# Patient Record
Sex: MALE | URBAN - METROPOLITAN AREA
[De-identification: names, ages, dates, MRNs, and addresses within clinical notes are randomized per-mention and may not be internally consistent; named-entity substitution may affect disease eponyms.]

---

## 2018-06-21 ENCOUNTER — IMPORTED ENCOUNTER (OUTPATIENT)
Dept: URBAN - METROPOLITAN AREA CLINIC 1 | Facility: CLINIC | Age: 73
End: 2018-06-21

## 2018-06-21 PROBLEM — E11.9: Noted: 2018-06-21

## 2018-06-21 PROBLEM — H10.45: Noted: 2018-06-21

## 2018-06-21 PROBLEM — Z79.84: Noted: 2018-06-21

## 2018-06-21 PROBLEM — H25.813: Noted: 2018-06-21

## 2018-06-21 PROCEDURE — 92015 DETERMINE REFRACTIVE STATE: CPT

## 2018-06-21 PROCEDURE — 92004 COMPRE OPH EXAM NEW PT 1/>: CPT

## 2018-06-21 NOTE — PATIENT DISCUSSION
1.  DM Type II (Oral Meds) -- Without sign of diabetic retinopathy and no blot heme on dilated retinal examination today OU No Macular Edema. Discussed the pathophysiology of diabetes and its effect on the eye and risk of blindness. Stressed the importance of strong glucose control. Advised of importance of at least yearly dilated examinations but to contact us immediately for any problems or concerns. 2. Cataracts OU --  Visually Significant secondary to glare discussed the risks benefits alternatives and limitations of cataract surgery. The patient stated a full understanding and a desire to proceed with the procedure. The patient will need to return for preop appointment with cataract measurements and to have any additional questions answered and start pre-operative eye drops as directed. Not examined by PMG today Phaco PCL OS first then ODOtherwise follow-up 6 MO 10 / [facility] / Juan Naun / [place] 3. Allergic Conjunctivitis OU -- The condition was  discussed with the patient. Avoidance of allergens and cool compresses were recommended. Has been being treated with Patanol that was previously being prescribed by Dr. Raj James. Continue Patanol Qday OU (written rx given) Letter to Dr. Sarah Burgos. Advised that patient is not legal to drive without glasses. Strongly advised patient to wear glasses while driving at night. Return for an appointment for a 10 / [facility] / [place] with Dr. eFmi Araujo.

## 2018-10-01 ENCOUNTER — IMPORTED ENCOUNTER (OUTPATIENT)
Dept: URBAN - METROPOLITAN AREA CLINIC 1 | Facility: CLINIC | Age: 73
End: 2018-10-01

## 2018-10-01 PROBLEM — H25.813: Noted: 2018-10-01

## 2018-10-01 PROCEDURE — 92136 OPHTHALMIC BIOMETRY: CPT

## 2018-10-01 PROCEDURE — 92012 INTRM OPH EXAM EST PATIENT: CPT

## 2018-10-01 NOTE — PATIENT DISCUSSION
Cataract OU:  Visually Significant secondary to glare discussed the risks benefits alternatives and limitations of cataract surgery. The patient stated a full understanding and a desire to proceed with the procedure. The patient will need to start pre-operative eye drops as directed. Proceed w/ phaco PCL OS then ODPt understands they will need glasses post-op to achieve their best corrected vision. Return for an appointment for sx OS with Dr. Raul Flowers.

## 2018-10-10 ENCOUNTER — IMPORTED ENCOUNTER (OUTPATIENT)
Dept: URBAN - METROPOLITAN AREA CLINIC 1 | Facility: CLINIC | Age: 73
End: 2018-10-10

## 2018-10-11 ENCOUNTER — IMPORTED ENCOUNTER (OUTPATIENT)
Dept: URBAN - METROPOLITAN AREA CLINIC 1 | Facility: CLINIC | Age: 73
End: 2018-10-11

## 2018-10-11 PROBLEM — Z96.1: Noted: 2018-10-11

## 2018-10-11 PROCEDURE — 99024 POSTOP FOLLOW-UP VISIT: CPT

## 2018-10-11 NOTE — PATIENT DISCUSSION
POD#1 CE/IOL OS doing well. Continue all 3 gtts as prescribed and until gone.  Prednisolone BID OS Acular Qdaily OS Vigamox TID OS Post op Warnings Reiterated RTC as scheduled

## 2018-10-16 ENCOUNTER — IMPORTED ENCOUNTER (OUTPATIENT)
Dept: URBAN - METROPOLITAN AREA CLINIC 1 | Facility: CLINIC | Age: 73
End: 2018-10-16

## 2018-10-16 PROBLEM — H25.811: Noted: 2018-10-16

## 2018-10-16 PROCEDURE — 92136 OPHTHALMIC BIOMETRY: CPT

## 2018-10-16 NOTE — PATIENT DISCUSSION
1.  Cataract OD: Visually Significant secondary to glare discussed the risks benefits alternatives and limitations of cataract surgery. The patient stated a full understanding and a desire to proceed with the procedure. Pt understands they will need glasses post-op to achieve their best corrected vision. Phaco PCL OD 2. POW#2  CE/IOL OS (Standard) doing well.    Use Prednisolone BID OS till out Use Acular Qdaily OS till out F/u as scheduled 2nd eye

## 2018-10-24 ENCOUNTER — IMPORTED ENCOUNTER (OUTPATIENT)
Dept: URBAN - METROPOLITAN AREA CLINIC 1 | Facility: CLINIC | Age: 73
End: 2018-10-24

## 2018-10-25 ENCOUNTER — IMPORTED ENCOUNTER (OUTPATIENT)
Dept: URBAN - METROPOLITAN AREA CLINIC 1 | Facility: CLINIC | Age: 73
End: 2018-10-25

## 2018-10-25 PROBLEM — Z96.1: Noted: 2018-10-25

## 2018-10-25 PROCEDURE — 99024 POSTOP FOLLOW-UP VISIT: CPT

## 2018-10-25 NOTE — PATIENT DISCUSSION
1. POD#1 Phaco/ PCL Standard OD- doing well. Continue all 3 gtts as prescribed and until gone. Prednisolone BID OD Acular Qdaily OD Vigamox TID OD  Post op Warnings Reiterated 2.  POW#2 Phaco/ PCL Standard OS- doing well Use Prednisolone BID OS till out Use Acular Qdaily OS till out Post op Warnings Reiterated RTC as scheduled

## 2018-11-19 ENCOUNTER — IMPORTED ENCOUNTER (OUTPATIENT)
Dept: URBAN - METROPOLITAN AREA CLINIC 1 | Facility: CLINIC | Age: 73
End: 2018-11-19

## 2018-11-19 PROBLEM — Z96.1: Noted: 2018-11-19

## 2018-11-19 PROCEDURE — 99024 POSTOP FOLLOW-UP VISIT: CPT

## 2018-11-19 NOTE — PATIENT DISCUSSION
POW#3 Phaco/ PCL OU (Standard OU) Doing well Finish PO meds per schedule MRX for glasses given Return for an appointment in June 30 with Dr. Aniya Reed. Return for an appointment in as needed with Dr. Angela Salguero.

## 2019-06-24 ENCOUNTER — IMPORTED ENCOUNTER (OUTPATIENT)
Dept: URBAN - METROPOLITAN AREA CLINIC 1 | Facility: CLINIC | Age: 74
End: 2019-06-24

## 2019-06-24 PROBLEM — H10.45: Noted: 2019-06-24

## 2019-06-24 PROBLEM — H04.123: Noted: 2019-06-24

## 2019-06-24 PROBLEM — E11.9: Noted: 2019-06-24

## 2019-06-24 PROBLEM — Z96.1: Noted: 2019-06-24

## 2019-06-24 PROBLEM — Z79.84: Noted: 2019-06-24

## 2019-06-24 PROCEDURE — 92014 COMPRE OPH EXAM EST PT 1/>: CPT

## 2019-06-24 NOTE — PATIENT DISCUSSION
1.  DM Type II (Oral Meds) -- Without sign of diabetic retinopathy and no blot heme on dilated retinal examination today OU and no Macular Edema OU: Discussed the pathophysiology of diabetes and its effect on the eye and risk of blindness. Stressed the importance of strong glucose control. Advised of importance of at least yearly dilated examinations but to contact us immediately for any problems or concerns. 2. Allergic Conjunctivitis OU -- The condition was discussed with the patient. Avoidance of allergens and cool compresses were recommended. Has been being treated with Patanol that was previously being prescribed by Dr. Oh Duque. Continue Patanol Qday OU (Hand Written Hard Copy Rx Given). 3. Dry Eyes OU -- Recommend increase the frequent use of OTC AT's BID-QID OU Routinely (Sample of Blink Given). 4. Pseudophakia OU (Standard OU) -- Doing well. Letter to Dr. Kristel Gil. Patient defers the refraction at today's visit. Return for an appointment in 1 YR for a 30 OU with Dr. Luma Buchanan.

## 2020-10-12 ENCOUNTER — IMPORTED ENCOUNTER (OUTPATIENT)
Dept: URBAN - METROPOLITAN AREA CLINIC 1 | Facility: CLINIC | Age: 75
End: 2020-10-12

## 2020-10-12 PROBLEM — H04.123: Noted: 2020-10-12

## 2020-10-12 PROBLEM — H10.45: Noted: 2020-10-12

## 2020-10-12 PROBLEM — Z79.84: Noted: 2020-10-12

## 2020-10-12 PROBLEM — E11.9: Noted: 2020-10-12

## 2020-10-12 PROCEDURE — 92015 DETERMINE REFRACTIVE STATE: CPT

## 2020-10-12 PROCEDURE — 92014 COMPRE OPH EXAM EST PT 1/>: CPT

## 2020-10-12 NOTE — PATIENT DISCUSSION
1.  DM Type II (Oral Meds) without sign of diabetic retinopathy and no blot heme on dilated retinal examination today OU No Macular Edema:  Discussed the pathophysiology of diabetes and its effect on the eye and risk of blindness. Stressed the importance of strong glucose control. Advised of importance of at least yearly dilated examinations but to contact us immediately for any problems or concerns. 2. Dry Eyes OU - Recommend cont the frequent use of OTC AT's BID-QID OU Routinely (sample of Blink given). (written rx  given of Refresh ATs)3. Allergic Conjunctivitis OU -- The condition was  discussed with the patient. Avoidance of allergens and cool compresses were recommended. 4. Pseudophakia OU (Standard OU) -- Doing well. MRX for glasses given. Return for an appointment in 1 year 27 with Dr. Naveen Valenzuela.

## 2021-07-17 ENCOUNTER — IMPORTED ENCOUNTER (OUTPATIENT)
Dept: URBAN - METROPOLITAN AREA CLINIC 1 | Facility: CLINIC | Age: 76
End: 2021-07-17

## 2021-07-17 NOTE — PATIENT DISCUSSION
1.  Left Peripapillary Retinal Hemorrhage - likely due to blood thinners and COVID-19. 2.  Right Sided Headache - likely secondary to distant head trauma and COVID 19. 3.   DM Type II (Oral Meds) without sign of diabetic retinopathy and no blot heme on dilated retinal examination today OU No Macular Edema:  Discussed the pathophysiology of diabetes and its effect on the eye and risk of blindness. Stressed the importance of strong glucose control. Advised of importance of at least yearly dilated examinations but to contact us immediately for any problems or concerns. 4. Dry Eyes OU - Recommend ATs BID-QID OU Routinely5. Allergic Conjunctivitis OU - The condition was  discussed with the patient. Avoidance of allergens and cool compresses were recommended. 6. Pseudophakia OU (Standard OU)Return for an appointment in 1 month 27 with Dr. Madie Randall.

## 2021-08-13 PROBLEM — H35.62: Noted: 2021-08-13

## 2021-10-22 ENCOUNTER — IMPORTED ENCOUNTER (OUTPATIENT)
Dept: URBAN - METROPOLITAN AREA CLINIC 1 | Facility: CLINIC | Age: 76
End: 2021-10-22

## 2021-10-22 PROBLEM — H43.811: Status: STABILIZING | Noted: 2021-10-22

## 2021-10-22 PROBLEM — H43.811: Noted: 2021-10-22

## 2021-10-22 PROBLEM — H04.123: Noted: 2021-10-22

## 2021-10-22 PROBLEM — Z96.1: Noted: 2021-10-22

## 2021-10-22 PROBLEM — H26.491: Noted: 2021-10-22

## 2021-10-22 PROBLEM — Z79.84: Noted: 2021-10-22

## 2021-10-22 PROBLEM — H10.45: Noted: 2021-10-22

## 2021-10-22 PROBLEM — E11.9: Noted: 2021-10-22

## 2021-10-22 PROCEDURE — 99214 OFFICE O/P EST MOD 30 MIN: CPT

## 2021-10-22 PROCEDURE — 92015 DETERMINE REFRACTIVE STATE: CPT

## 2021-10-22 NOTE — PATIENT DISCUSSION
1.  DM Type II without sign of diabetic retinopathy and no blot heme on dilated retinal examination today OU No Macular Edema:  Discussed the pathophysiology of diabetes and its effect on the eye and risk of blindness. Stressed the importance of strong glucose control. Advised of importance of at least yearly dilated examinations but to contact us immediately for any problems or concerns. 2. Dry Eyes OU - Recommend ATs BID OU routinely 3. PCO  OD (Posterior Capsule Opacification)  Observe and consider yag cap when pt feels pco visually significant and visual acuity decreases to appropriate level. 4. Pseudophakia OU - (Standard OU PMG) 5. Allergic Conjunctivitis OU - The condition was  discussed with the patient. Avoidance of allergens and cool compresses were recommended. 6. PVD w/o Tear OD - RD precautions. 7.  H/o Retinal Hemorrhage OS - Resolved  MRX for glasses given. Return for an appointment in 1 year 27 with Dr. Arlene Koch.

## 2022-04-02 ASSESSMENT — VISUAL ACUITY
OD_SC: 20/25-2
OD_SC: 20/25
OD_CC: 20/60-1
OS_CC: 20/50-1
OS_GLARE: 20/100
OD_CC: 20/40
OD_GLARE: 20/100
OS_SC: 20/20
OD_SC: 20/25
OD_PH: SC 20/40
OD_SC: 20/20-1
OD_CC: 20/70
OS_CC: J1
OD_CC: J1
OS_CC: 20/30-1
OS_CC: 20/70
OS_CC: 20/50-2
OS_SC: 20/25
OS_CC: 20/30-2
OD_GLARE: 20/100
OS_SC: 20/20-2
OS_SC: 20/20-1
OS_CC: 20/70
OS_CC: J1
OD_CC: 20/70+1
OD_CC: J1
OD_CC: 20/150

## 2022-04-02 ASSESSMENT — TONOMETRY
OS_IOP_MMHG: 14
OS_IOP_MMHG: 13
OD_IOP_MMHG: 14
OS_IOP_MMHG: 13
OD_IOP_MMHG: 12
OD_IOP_MMHG: 14
OD_IOP_MMHG: 12
OS_IOP_MMHG: 13
OD_IOP_MMHG: 12
OD_IOP_MMHG: 14
OS_IOP_MMHG: 13
OS_IOP_MMHG: 13
OS_IOP_MMHG: 12
OD_IOP_MMHG: 14
OS_IOP_MMHG: 14

## 2024-02-06 ENCOUNTER — COMPREHENSIVE EXAM (OUTPATIENT)
Dept: URBAN - METROPOLITAN AREA CLINIC 1 | Facility: CLINIC | Age: 79
End: 2024-02-06

## 2024-02-06 DIAGNOSIS — H43.811: ICD-10-CM

## 2024-02-06 DIAGNOSIS — H04.123: ICD-10-CM

## 2024-02-06 DIAGNOSIS — E11.9: ICD-10-CM

## 2024-02-06 PROCEDURE — 99214 OFFICE O/P EST MOD 30 MIN: CPT

## 2024-02-06 ASSESSMENT — VISUAL ACUITY
OD_CC: J1
OS_CC: J1
OD_CC: 20/20
OS_CC: 20/20

## 2024-02-06 ASSESSMENT — TONOMETRY
OS_IOP_MMHG: 14
OD_IOP_MMHG: 14